# Patient Record
Sex: MALE | Race: WHITE | NOT HISPANIC OR LATINO | Employment: UNEMPLOYED | URBAN - NONMETROPOLITAN AREA
[De-identification: names, ages, dates, MRNs, and addresses within clinical notes are randomized per-mention and may not be internally consistent; named-entity substitution may affect disease eponyms.]

---

## 2024-09-19 ENCOUNTER — OFFICE VISIT (OUTPATIENT)
Dept: FAMILY MEDICINE CLINIC | Age: 19
End: 2024-09-19
Payer: COMMERCIAL

## 2024-09-19 VITALS
BODY MASS INDEX: 26.04 KG/M2 | HEIGHT: 71 IN | OXYGEN SATURATION: 97 % | SYSTOLIC BLOOD PRESSURE: 123 MMHG | DIASTOLIC BLOOD PRESSURE: 67 MMHG | WEIGHT: 186 LBS | HEART RATE: 64 BPM

## 2024-09-19 DIAGNOSIS — G43.009 MIGRAINE WITHOUT AURA AND WITHOUT STATUS MIGRAINOSUS, NOT INTRACTABLE: Primary | ICD-10-CM

## 2024-09-19 DIAGNOSIS — Z23 NEED FOR IMMUNIZATION AGAINST INFLUENZA: ICD-10-CM

## 2024-09-19 PROCEDURE — 99203 OFFICE O/P NEW LOW 30 MIN: CPT | Performed by: NURSE PRACTITIONER

## 2024-09-19 PROCEDURE — 90471 IMMUNIZATION ADMIN: CPT | Performed by: NURSE PRACTITIONER

## 2024-09-19 PROCEDURE — 90656 IIV3 VACC NO PRSV 0.5 ML IM: CPT | Performed by: NURSE PRACTITIONER

## 2024-09-19 RX ORDER — PROMETHAZINE HYDROCHLORIDE 25 MG/1
25 SUPPOSITORY RECTAL DAILY PRN
Qty: 10 SUPPOSITORY | Refills: 0 | Status: SHIPPED | OUTPATIENT
Start: 2024-09-19

## 2024-09-19 RX ORDER — ONDANSETRON 4 MG/1
4 TABLET, FILM COATED ORAL EVERY 8 HOURS PRN
Qty: 20 TABLET | Refills: 0 | Status: SHIPPED | OUTPATIENT
Start: 2024-09-19

## 2024-09-19 RX ORDER — RIZATRIPTAN BENZOATE 5 MG/1
10 TABLET ORAL ONCE AS NEEDED
Qty: 20 TABLET | Refills: 0 | Status: SHIPPED | OUTPATIENT
Start: 2024-09-19

## 2024-09-23 ENCOUNTER — TELEPHONE (OUTPATIENT)
Dept: FAMILY MEDICINE CLINIC | Age: 19
End: 2024-09-23
Payer: COMMERCIAL

## 2024-10-23 ENCOUNTER — TELEPHONE (OUTPATIENT)
Dept: FAMILY MEDICINE CLINIC | Age: 19
End: 2024-10-23
Payer: COMMERCIAL

## 2025-04-22 ENCOUNTER — OFFICE VISIT (OUTPATIENT)
Dept: FAMILY MEDICINE CLINIC | Age: 20
End: 2025-04-22
Payer: COMMERCIAL

## 2025-04-22 VITALS
HEIGHT: 71 IN | HEART RATE: 97 BPM | OXYGEN SATURATION: 100 % | SYSTOLIC BLOOD PRESSURE: 130 MMHG | WEIGHT: 187 LBS | DIASTOLIC BLOOD PRESSURE: 69 MMHG | BODY MASS INDEX: 26.18 KG/M2 | TEMPERATURE: 98.9 F

## 2025-04-22 DIAGNOSIS — G43.009 MIGRAINE WITHOUT AURA AND WITHOUT STATUS MIGRAINOSUS, NOT INTRACTABLE: Primary | ICD-10-CM

## 2025-04-22 DIAGNOSIS — F41.1 GAD (GENERALIZED ANXIETY DISORDER): ICD-10-CM

## 2025-04-22 PROCEDURE — 99214 OFFICE O/P EST MOD 30 MIN: CPT | Performed by: NURSE PRACTITIONER

## 2025-04-22 RX ORDER — FLUOXETINE 10 MG/1
10 CAPSULE ORAL DAILY
Qty: 30 CAPSULE | Refills: 0 | Status: SHIPPED | OUTPATIENT
Start: 2025-04-22

## 2025-04-22 RX ORDER — HYDROXYZINE HYDROCHLORIDE 10 MG/1
10 TABLET, FILM COATED ORAL 3 TIMES DAILY PRN
Qty: 90 TABLET | Refills: 0 | Status: SHIPPED | OUTPATIENT
Start: 2025-04-22

## 2025-04-22 RX ORDER — ONDANSETRON 4 MG/1
4 TABLET, FILM COATED ORAL EVERY 8 HOURS PRN
Qty: 40 TABLET | Refills: 0 | Status: SHIPPED | OUTPATIENT
Start: 2025-04-22

## 2025-04-22 RX ORDER — RIZATRIPTAN BENZOATE 5 MG/1
10 TABLET ORAL ONCE AS NEEDED
Qty: 20 TABLET | Refills: 0 | Status: SHIPPED | OUTPATIENT
Start: 2025-04-22

## 2025-04-22 NOTE — PROGRESS NOTES
Chief Complaint  Anxiety (Anxiety about going places, effecting day to day life. )    Subjective        Tremaine Linton presents to [facility] today for       History of Present Illness  The patient is a 19-year-old male who presents for issues with anxiety.     Anxiety has been a long-standing issue since his senior year of high school, during which buspirone was prescribed but discontinued due to unfavorable side effects. Anxiety is most pronounced in social situations, such as attending basketball games, where nervousness and sweating are experienced. Over the past two weeks, symptoms of feeling nervous or on edge have occurred sometimes, while difficulty in controlling worry has been present half the time. Constant worry about various matters is reported. Relaxing and watching TV shows is generally not problematic, and sitting still is manageable. Irritation or annoyance and fear of impending disaster are experienced about half the time. Although depressive symptoms are not reported, nervousness and sweating occur a few times a week. Previous episodes of diarrhea and vomiting due to anxiety are noted.    A refill of headache medication is requested.    A refill of nausea medication is also requested.          Current Outpatient Medications:     ondansetron (Zofran) 4 MG tablet, Take 1 tablet by mouth Every 8 (Eight) Hours As Needed for Nausea or Vomiting., Disp: 40 tablet, Rfl: 0    rizatriptan (MAXALT) 5 MG tablet, Take 2 tablets by mouth 1 (One) Time As Needed for Migraine for up to 1 dose. May repeat in 2 hours if needed, Disp: 20 tablet, Rfl: 0    FLUoxetine (PROzac) 10 MG capsule, Take 1 capsule by mouth Daily., Disp: 30 capsule, Rfl: 0    hydrOXYzine (ATARAX) 10 MG tablet, Take 1 tablet by mouth 3 (Three) Times a Day As Needed for Anxiety., Disp: 90 tablet, Rfl: 0    promethazine (PHENERGAN) 25 MG suppository, Insert 1 suppository into the rectum Daily As Needed for  "Nausea or Vomiting. (Patient not taking: Reported on 4/22/2025), Disp: 10 suppository, Rfl: 0  Medications Discontinued During This Encounter   Medication Reason    rizatriptan (MAXALT) 5 MG tablet Reorder    ondansetron (Zofran) 4 MG tablet Reorder         Allergies:  Patient has no known allergies.      Objective   Vital Signs:   Vitals:    04/22/25 1556   BP: 130/69   BP Location: Left arm   Patient Position: Sitting   Cuff Size: Adult   Pulse: 97   Temp: 98.9 °F (37.2 °C)   TempSrc: Oral   SpO2: 100%   Weight: 84.8 kg (187 lb)   Height: 180.3 cm (71\")     Body mass index is 26.08 kg/m².           Physical Exam  Constitutional:       Appearance: Normal appearance.   Neck:      Vascular: No carotid bruit.   Cardiovascular:      Rate and Rhythm: Normal rate and regular rhythm.      Heart sounds: Normal heart sounds.   Pulmonary:      Effort: Pulmonary effort is normal.      Breath sounds: Normal breath sounds.   Musculoskeletal:         General: Normal range of motion.   Skin:     General: Skin is warm and dry.   Neurological:      General: No focal deficit present.      Mental Status: He is alert.   Psychiatric:         Mood and Affect: Mood normal.         Behavior: Behavior normal.               PHQ-9 Depression Screening  Little interest or pleasure in doing things? Not at all   Feeling down, depressed, or hopeless? Not at all   PHQ-2 Total Score 0   Trouble falling or staying asleep, or sleeping too much?     Feeling tired or having little energy?     Poor appetite or overeating?     Feeling bad about yourself - or that you are a failure or have let yourself or your family down?     Trouble concentrating on things, such as reading the newspaper or watching television?     Moving or speaking so slowly that other people could have noticed? Or the opposite - being so fidgety or restless that you have been moving around a lot more than usual?       Thoughts that you would be better off dead, or of hurting yourself " in some way?     PHQ-9 Total Score     If you checked off any problems, how difficult have these problems made it for you to do your work, take care of things at home, or get along with other people? Not difficult at all          MORENA-7 Anxiety Screening  Feeling nervous, anxious or on edge?  1   Not being able to stop or control worrying?  2   Worrying too much about different things?  3   Trouble relaxing?  1   Being so restless that it is hard to sit still?  0   Becoming easily annoyed or irritable?  1   Feeling afraid as if something awful might happen?  2   MORENA-7 Total Score  10   If you checked any problems, how difficult have these problems made it for you to do your work, take care of things at home, or get along with other people?    none          Procedures         Diagnoses and all orders for this visit:    1. Migraine without aura and without status migrainosus, not intractable (Primary)  -     rizatriptan (MAXALT) 5 MG tablet; Take 2 tablets by mouth 1 (One) Time As Needed for Migraine for up to 1 dose. May repeat in 2 hours if needed  Dispense: 20 tablet; Refill: 0  -     ondansetron (Zofran) 4 MG tablet; Take 1 tablet by mouth Every 8 (Eight) Hours As Needed for Nausea or Vomiting.  Dispense: 40 tablet; Refill: 0    2. MORENA (generalized anxiety disorder)  -     hydrOXYzine (ATARAX) 10 MG tablet; Take 1 tablet by mouth 3 (Three) Times a Day As Needed for Anxiety.  Dispense: 90 tablet; Refill: 0  -     FLUoxetine (PROzac) 10 MG capsule; Take 1 capsule by mouth Daily.  Dispense: 30 capsule; Refill: 0          Assessment & Plan  1. Anxiety.  - Reports experiencing anxiety symptoms, particularly in situations involving large crowds or before events.  - Symptoms include nervousness, excessive worrying, and physical manifestations such as sweating and nausea.  - A  non-addictive medication will be prescribed for use up to three times daily as needed for anxiety management. Additionally, fluoxetine 10 mg will  be initiated to help control anxiety, with potential side effects of constipation and dry mouth discussed.  - Advised to take fluoxetine in the morning. If symptoms improve, the current regimen will be maintained; otherwise, adjustments may be made. Encouraged to sign [facility] for [facility] for easy communication regarding any issues with the medication.    2. Headache.  - A refill for headache medication will be sent to [facility].  - No new symptoms or changes in headache pattern reported.  - Previous headache medication has been effective in managing symptoms.  - Continued use of current headache medication as needed.    3. Nausea.  - Uses oral nausea medication and keeps rectal medication at home for emergencies.  - A refill for nausea medication will be sent to [facility].  - No new symptoms or changes in nausea pattern reported.  - Continued use of current nausea medication as needed.    Follow-up  - Follow up in 30 days to evaluate the effectiveness of the current treatment plan.              Follow Up  Return in about 4 weeks (around 5/20/2025) for Recheck.  Patient was given instructions and counseling regarding his condition or for health maintenance advice. Please see specific information pulled into the AVS if appropriate.           EDWARD Ontiveros  04/22/2025    Please note that portions of this document were completed using a voice recognition program.     Patient or patient representative verbalized consent for the use of Ambient Listening during the visit with  EDWARD Ontiveros for chart documentation. 4/22/2025  17:45 EDT

## 2025-06-05 ENCOUNTER — OFFICE VISIT (OUTPATIENT)
Dept: FAMILY MEDICINE CLINIC | Age: 20
End: 2025-06-05
Payer: COMMERCIAL

## 2025-06-05 VITALS
DIASTOLIC BLOOD PRESSURE: 75 MMHG | BODY MASS INDEX: 25.68 KG/M2 | WEIGHT: 183.4 LBS | SYSTOLIC BLOOD PRESSURE: 120 MMHG | HEART RATE: 74 BPM | OXYGEN SATURATION: 100 % | HEIGHT: 71 IN | TEMPERATURE: 98.5 F

## 2025-06-05 DIAGNOSIS — G43.009 MIGRAINE WITHOUT AURA AND WITHOUT STATUS MIGRAINOSUS, NOT INTRACTABLE: Primary | ICD-10-CM

## 2025-06-05 DIAGNOSIS — F41.1 GAD (GENERALIZED ANXIETY DISORDER): ICD-10-CM

## 2025-06-05 PROCEDURE — 99213 OFFICE O/P EST LOW 20 MIN: CPT | Performed by: NURSE PRACTITIONER

## 2025-06-05 RX ORDER — FLUOXETINE 10 MG/1
10 CAPSULE ORAL DAILY
Qty: 90 CAPSULE | Refills: 1 | Status: SHIPPED | OUTPATIENT
Start: 2025-06-05

## 2025-06-05 NOTE — PROGRESS NOTES
"Chief Complaint  Anxiety (1 month f/u- states the medicine has made it better)    Subjective        Tremaine Linton presents to [facility] today for       History of Present Illness      Patient is here for 1 month follow-up on anxiety and depression, is taking fluoxetine 10 mg daily, with a huge improvement in his symptoms.  States he is tolerate nation fine and is not have any side effects it is made him feel a lot better and is even noticed that with controlling his anxiety that he has not had any headaches since his last office visit.      Current Outpatient Medications:     FLUoxetine (PROzac) 10 MG capsule, Take 1 capsule by mouth Daily., Disp: 90 capsule, Rfl: 1    hydrOXYzine (ATARAX) 10 MG tablet, Take 1 tablet by mouth 3 (Three) Times a Day As Needed for Anxiety., Disp: 90 tablet, Rfl: 0    ondansetron (Zofran) 4 MG tablet, Take 1 tablet by mouth Every 8 (Eight) Hours As Needed for Nausea or Vomiting., Disp: 40 tablet, Rfl: 0    rizatriptan (MAXALT) 5 MG tablet, Take 2 tablets by mouth 1 (One) Time As Needed for Migraine for up to 1 dose. May repeat in 2 hours if needed, Disp: 20 tablet, Rfl: 0    promethazine (PHENERGAN) 25 MG suppository, Insert 1 suppository into the rectum Daily As Needed for Nausea or Vomiting. (Patient not taking: Reported on 6/5/2025), Disp: 10 suppository, Rfl: 0  Medications Discontinued During This Encounter   Medication Reason    FLUoxetine (PROzac) 10 MG capsule Reorder         Allergies:  Patient has no known allergies.      Objective   Vital Signs:   Vitals:    06/05/25 1615 06/05/25 1633   BP: 152/66 120/75   BP Location: Right arm Right arm   Patient Position: Sitting    Cuff Size: Adult    Pulse: 74    Temp: 98.5 °F (36.9 °C)    TempSrc: Oral    SpO2: 100%    Weight: 83.2 kg (183 lb 6.4 oz)    Height: 180.3 cm (70.98\")      Body mass index is 25.59 kg/m².           Physical Exam  Constitutional:       Appearance: Normal appearance. "   Neck:      Vascular: No carotid bruit.   Cardiovascular:      Rate and Rhythm: Normal rate and regular rhythm.      Heart sounds: Normal heart sounds.   Pulmonary:      Effort: Pulmonary effort is normal.      Breath sounds: Normal breath sounds.   Musculoskeletal:         General: Normal range of motion.   Skin:     General: Skin is warm and dry.   Neurological:      General: No focal deficit present.      Mental Status: He is alert.   Psychiatric:         Mood and Affect: Mood normal.         Behavior: Behavior normal.           .[email or web address]                  Procedures         Diagnoses and all orders for this visit:    1. Migraine without aura and without status migrainosus, not intractable (Primary)  Comments:  Take medication as needed for migraines.    2. MORENA (generalized anxiety disorder)  -     FLUoxetine (PROzac) 10 MG capsule; Take 1 capsule by mouth Daily.  Dispense: 90 capsule; Refill: 1          Assessment & Plan                Follow Up  Return in about 6 months (around 12/5/2025) for Annual physical.  Patient was given instructions and counseling regarding his condition or for health maintenance advice. Please see specific information pulled into the AVS if appropriate.           EDWARD Ontiveros  06/05/2025    Please note that portions of this document were completed using a voice recognition program.     Patient or patient representative verbalized consent for the use of Ambient Listening during the visit with  EDWARD Ontiveros for chart documentation. 6/5/2025  16:29 EDT